# Patient Record
Sex: FEMALE | Race: WHITE | NOT HISPANIC OR LATINO | Employment: UNEMPLOYED | URBAN - METROPOLITAN AREA
[De-identification: names, ages, dates, MRNs, and addresses within clinical notes are randomized per-mention and may not be internally consistent; named-entity substitution may affect disease eponyms.]

---

## 2022-05-20 ENCOUNTER — LAB (OUTPATIENT)
Dept: LAB | Facility: HOSPITAL | Age: 47
End: 2022-05-20

## 2022-05-20 ENCOUNTER — OFFICE VISIT (OUTPATIENT)
Dept: NEUROLOGY | Facility: CLINIC | Age: 47
End: 2022-05-20

## 2022-05-20 VITALS
WEIGHT: 209.2 LBS | HEIGHT: 63 IN | DIASTOLIC BLOOD PRESSURE: 80 MMHG | OXYGEN SATURATION: 98 % | BODY MASS INDEX: 37.07 KG/M2 | HEART RATE: 76 BPM | SYSTOLIC BLOOD PRESSURE: 122 MMHG | TEMPERATURE: 97.5 F

## 2022-05-20 DIAGNOSIS — R42 VERTIGO: ICD-10-CM

## 2022-05-20 DIAGNOSIS — F41.9 ANXIETY: ICD-10-CM

## 2022-05-20 DIAGNOSIS — R42 VERTIGO: Primary | ICD-10-CM

## 2022-05-20 DIAGNOSIS — G43.709 CHRONIC MIGRAINE WITHOUT AURA WITHOUT STATUS MIGRAINOSUS, NOT INTRACTABLE: ICD-10-CM

## 2022-05-20 PROBLEM — M06.9 RHEUMATOID ARTHRITIS INVOLVING MULTIPLE SITES: Status: ACTIVE | Noted: 2022-05-20

## 2022-05-20 PROBLEM — U07.1 COVID-19 VIRUS INFECTION: Status: ACTIVE | Noted: 2022-05-20

## 2022-05-20 LAB
ALBUMIN SERPL-MCNC: 4 G/DL (ref 3.5–5.2)
ALBUMIN/GLOB SERPL: 1.1 G/DL
ALP SERPL-CCNC: 69 U/L (ref 39–117)
ALT SERPL W P-5'-P-CCNC: 24 U/L (ref 1–33)
ANION GAP SERPL CALCULATED.3IONS-SCNC: 11 MMOL/L (ref 5–15)
AST SERPL-CCNC: 19 U/L (ref 1–32)
BASOPHILS # BLD AUTO: 0.06 10*3/MM3 (ref 0–0.2)
BASOPHILS NFR BLD AUTO: 0.7 % (ref 0–1.5)
BILIRUB SERPL-MCNC: 0.4 MG/DL (ref 0–1.2)
BUN SERPL-MCNC: 9 MG/DL (ref 6–20)
BUN/CREAT SERPL: 10.3 (ref 7–25)
CALCIUM SPEC-SCNC: 9.8 MG/DL (ref 8.6–10.5)
CHLORIDE SERPL-SCNC: 107 MMOL/L (ref 98–107)
CHROMATIN AB SERPL-ACNC: <10 IU/ML (ref 0–14)
CK SERPL-CCNC: 39 U/L (ref 20–180)
CO2 SERPL-SCNC: 23 MMOL/L (ref 22–29)
CREAT SERPL-MCNC: 0.87 MG/DL (ref 0.57–1)
DEPRECATED RDW RBC AUTO: 44.2 FL (ref 37–54)
EGFRCR SERPLBLD CKD-EPI 2021: 84.4 ML/MIN/1.73
EOSINOPHIL # BLD AUTO: 0.26 10*3/MM3 (ref 0–0.4)
EOSINOPHIL NFR BLD AUTO: 3 % (ref 0.3–6.2)
ERYTHROCYTE [DISTWIDTH] IN BLOOD BY AUTOMATED COUNT: 12.7 % (ref 12.3–15.4)
FOLATE SERPL-MCNC: 11.6 NG/ML (ref 4.78–24.2)
GLOBULIN UR ELPH-MCNC: 3.5 GM/DL
GLUCOSE SERPL-MCNC: 101 MG/DL (ref 65–99)
HCT VFR BLD AUTO: 43.5 % (ref 34–46.6)
HGB BLD-MCNC: 14.2 G/DL (ref 12–15.9)
IMM GRANULOCYTES # BLD AUTO: 0.02 10*3/MM3 (ref 0–0.05)
IMM GRANULOCYTES NFR BLD AUTO: 0.2 % (ref 0–0.5)
LYMPHOCYTES # BLD AUTO: 2.06 10*3/MM3 (ref 0.7–3.1)
LYMPHOCYTES NFR BLD AUTO: 23.5 % (ref 19.6–45.3)
MCH RBC QN AUTO: 31.1 PG (ref 26.6–33)
MCHC RBC AUTO-ENTMCNC: 32.6 G/DL (ref 31.5–35.7)
MCV RBC AUTO: 95.2 FL (ref 79–97)
MONOCYTES # BLD AUTO: 0.55 10*3/MM3 (ref 0.1–0.9)
MONOCYTES NFR BLD AUTO: 6.3 % (ref 5–12)
NEUTROPHILS NFR BLD AUTO: 5.83 10*3/MM3 (ref 1.7–7)
NEUTROPHILS NFR BLD AUTO: 66.3 % (ref 42.7–76)
NRBC BLD AUTO-RTO: 0 /100 WBC (ref 0–0.2)
PLATELET # BLD AUTO: 397 10*3/MM3 (ref 140–450)
PMV BLD AUTO: 9.6 FL (ref 6–12)
POTASSIUM SERPL-SCNC: 4.7 MMOL/L (ref 3.5–5.2)
PROT SERPL-MCNC: 7.5 G/DL (ref 6–8.5)
RBC # BLD AUTO: 4.57 10*6/MM3 (ref 3.77–5.28)
SODIUM SERPL-SCNC: 141 MMOL/L (ref 136–145)
TSH SERPL DL<=0.05 MIU/L-ACNC: 2 UIU/ML (ref 0.27–4.2)
VIT B12 BLD-MCNC: 306 PG/ML (ref 211–946)
WBC NRBC COR # BLD: 8.78 10*3/MM3 (ref 3.4–10.8)

## 2022-05-20 PROCEDURE — 86235 NUCLEAR ANTIGEN ANTIBODY: CPT

## 2022-05-20 PROCEDURE — 80053 COMPREHEN METABOLIC PANEL: CPT

## 2022-05-20 PROCEDURE — 86362 MOG-IGG1 ANTB CBA EACH: CPT

## 2022-05-20 PROCEDURE — 86225 DNA ANTIBODY NATIVE: CPT

## 2022-05-20 PROCEDURE — 82607 VITAMIN B-12: CPT

## 2022-05-20 PROCEDURE — 82746 ASSAY OF FOLIC ACID SERUM: CPT

## 2022-05-20 PROCEDURE — 86051 AQUAPORIN-4 ANTB ELISA: CPT

## 2022-05-20 PROCEDURE — 86038 ANTINUCLEAR ANTIBODIES: CPT

## 2022-05-20 PROCEDURE — 84443 ASSAY THYROID STIM HORMONE: CPT

## 2022-05-20 PROCEDURE — 85025 COMPLETE CBC W/AUTO DIFF WBC: CPT

## 2022-05-20 PROCEDURE — 99204 OFFICE O/P NEW MOD 45 MIN: CPT | Performed by: NURSE PRACTITIONER

## 2022-05-20 PROCEDURE — 82550 ASSAY OF CK (CPK): CPT

## 2022-05-20 PROCEDURE — 36415 COLL VENOUS BLD VENIPUNCTURE: CPT

## 2022-05-20 PROCEDURE — 86431 RHEUMATOID FACTOR QUANT: CPT

## 2022-05-20 RX ORDER — ALBUTEROL SULFATE 90 UG/1
2 AEROSOL, METERED RESPIRATORY (INHALATION) EVERY 4 HOURS PRN
COMMUNITY

## 2022-05-20 RX ORDER — TOPIRAMATE 50 MG/1
TABLET, FILM COATED ORAL
Qty: 60 TABLET | Refills: 2 | Status: SHIPPED | OUTPATIENT
Start: 2022-05-20 | End: 2022-08-15

## 2022-05-20 RX ORDER — IBUPROFEN 400 MG/1
400 TABLET ORAL 2 TIMES DAILY
COMMUNITY

## 2022-05-20 RX ORDER — MECLIZINE HYDROCHLORIDE 25 MG/1
25 TABLET ORAL 3 TIMES DAILY PRN
COMMUNITY
Start: 2022-04-07

## 2022-05-20 RX ORDER — CETIRIZINE HYDROCHLORIDE 10 MG/1
10 TABLET ORAL DAILY
COMMUNITY

## 2022-05-20 RX ORDER — FLUTICASONE PROPIONATE 50 MCG
2 SPRAY, SUSPENSION (ML) NASAL DAILY
COMMUNITY

## 2022-05-20 RX ORDER — ACETAMINOPHEN 500 MG
500 TABLET ORAL 2 TIMES DAILY
COMMUNITY

## 2022-05-20 RX ORDER — ATORVASTATIN CALCIUM 10 MG/1
10 TABLET, FILM COATED ORAL NIGHTLY
COMMUNITY
Start: 2022-05-17

## 2022-05-20 RX ORDER — OLOPATADINE HYDROCHLORIDE 2 MG/ML
SOLUTION/ DROPS OPHTHALMIC DAILY
COMMUNITY

## 2022-05-20 RX ORDER — FLUOXETINE HYDROCHLORIDE 20 MG/1
20 CAPSULE ORAL DAILY
COMMUNITY

## 2022-05-20 NOTE — PROGRESS NOTES
Headache New Office Visit      Encounter Date: 2022   Patient Name: Anita MONTERO  : 1975   MRN: [id number]   PCP: DENISE Ojeda  Chief Complaint:    Chief Complaint   Patient presents with   • Headache   • Dizziness       History of Present Illness: Anita MONTERO is a 46 y.o. female who is here today for evaluation of headaches and new dizziness symptoms.    Onset age 28.  Headache Symptoms:   Days per month: 20 days  Location: Right temple  and Left temple       Quality: Throbbing        Duration: 1 hour  Severity: 6/10  Triggers: decreased sleep, not eating, stress, allergies  Associated Symptoms: Photophobia, Phonophobia, Dizziness and  Vision changes white flashes  Aura: none      Abortives: Daily IBU 1200mg daily, Tylenol, Imitrex-dizzy and vomit  Preventives propranolol.    No imaging. Has not seen a Neurologist in the past.                                                                               Dizziness/Vertigo  This is independent of HA.  Onset age 38. Room spinning, off balance, holds on to the wall. Sudden onset. No diplopia or loss of vision. Nausea, vomiting, decreased hearing in left, tinnitus high pitched squeal. Controlled with meclizine and flonase and resolved in a few hours.    Now w new symptoms of vertigo  Onset w covid in 2022. Better for 2 months and returned. Daily.   Sudden onset veritgo, nausea, vomiting, palpitations, diaphoresis, occasionally w arm heaviness and tingling. No decreased hearing or tinnitus. Duration is 24/7. If she keeps her head still she does not have symptoms. Duration is 7-10 seconds. Triggered by movement.  Not resolved with meclizine or Flonase  Saw ENT Dr Carroll in [place]. No testing.    Seeing Cardiology-abnormal holter w tachycardia and ANDREW, echo. Refused nuclear GXT.    Anxiety  Has anxiety regarding her health. Does not like to take medications. Willing to have MRI and labs.      PMH: RA-previously on methotrexate, covid  infection, anxiety, cluster HA, psoriasis  Subjective      Past Medical History:   Past Medical History:   Diagnosis Date   • Anxiety    • Arthritis    • Chronic mental illness    • Cluster headache    • Depression    • Dizziness    • Fainting    • Gait abnormality    • Headache, tension-type    • High cholesterol    • History of bladder problems    • Hypertension    • Memory loss    • Migraine    • Numbness and tingling    • Rheumatoid arthritis involving multiple sites ([facility]) 2022   • Weakness        Past Surgical History:   Past Surgical History:   Procedure Laterality Date   • GALLBLADDER SURGERY     • TUBAL ABDOMINAL LIGATION         Family History:   Family History   Problem Relation Age of Onset   • Alzheimer's disease Mother    • Heart disease Mother    • Hypertension Mother    • High cholesterol Mother    • Obesity Mother    • Thyroid disease Mother    • Obesity Father    • Lung cancer Father    • Obesity Sister    • Obesity Brother    • Epilepsy Brother    • Obesity Maternal Aunt    • Obesity Maternal Uncle    • Obesity Paternal Aunt    • Obesity Paternal Uncle    • Obesity Maternal Grandmother    • Heart disease Maternal Grandmother    • Obesity Maternal Grandfather    • Obesity Paternal Grandmother    • Obesity Paternal Grandfather        Social History:   Social History     Socioeconomic History   • Marital status:    Tobacco Use   • Smoking status: Former Smoker     Start date:      Quit date: 2022     Years since quittin.3   Vaping Use   • Vaping Use: Never used   Substance and Sexual Activity   • Alcohol use: Not Currently     Comment: 4-5 beers a week.   • Drug use: Never   • Sexual activity: Defer       Medications:     Current Outpatient Medications:   •  acetaminophen (TYLENOL) 500 MG tablet, Take 500 mg by mouth 2 (Two) Times a Day., Disp: , Rfl:   •  albuterol sulfate  (90 Base) MCG/ACT inhaler, Inhale 2 puffs Every 4 (Four) Hours As Needed for Wheezing.,  Disp: , Rfl:   •  atorvastatin (LIPITOR) 10 MG tablet, , Disp: , Rfl:   •  cetirizine (zyrTEC) 10 MG tablet, Take 10 mg by mouth Daily., Disp: , Rfl:   •  FLUoxetine (PROzac) 20 MG capsule, Take 20 mg by mouth Daily., Disp: , Rfl:   •  fluticasone (FLONASE) 50 MCG/ACT nasal spray, 2 sprays into the nostril(s) as directed by provider Daily., Disp: , Rfl:   •  ibuprofen (ADVIL,MOTRIN) 400 MG tablet, Take 400 mg by mouth 2 (Two) Times a Day., Disp: , Rfl:   •  Multiple Vitamins-Minerals (EMERGEN-C BLUE PO), Take  by mouth Daily., Disp: , Rfl:   •  olopatadine (PATADAY) 0.2 % solution ophthalmic solution, Daily., Disp: , Rfl:   •  meclizine (ANTIVERT) 25 MG tablet, Take 25 mg by mouth 3 (Three) Times a Day As Needed., Disp: , Rfl:   •  topiramate (Topamax) 50 MG tablet, 1/2 tab q hs x 1 week, then 1/2 tab bid, 1 week, then 1 tab bid, Disp: 60 tablet, Rfl: 2    Allergies:   No Known Allergies    PHQ-9 Total Score:     STEADI Fall Risk Assessment has not been completed.    Objective     Physical Exam:   Physical Exam  Eyes:      Pupils: Pupils are equal, round, and reactive to light.   Neurological:      Mental Status: She is oriented to person, place, and time.      Coordination: Finger-Nose-Finger Test, Heel to Shin Test and Romberg Test normal.      Gait: Gait is intact.      Deep Tendon Reflexes:      Reflex Scores:       Tricep reflexes are 2+ on the right side and 2+ on the left side.       Bicep reflexes are 2+ on the right side and 2+ on the left side.       Brachioradialis reflexes are 2+ on the right side and 2+ on the left side.       Patellar reflexes are 2+ on the right side and 2+ on the left side.       Achilles reflexes are 2+ on the right side and 2+ on the left side.  Psychiatric:         Speech: Speech normal.         Neurologic Exam     Mental Status   Oriented to person, place, and time.   Follows 3 step commands.   Attention: normal. Concentration: normal.   Speech: speech is normal   Level of  "consciousness: alert  Knowledge: consistent with education.   Normal comprehension.   Tearful     Cranial Nerves     CN III, IV, VI   Pupils are equal, round, and reactive to light.  Right pupil: Accommodation: intact.   Left pupil: Accommodation: intact.   CN III: no CN III palsy  CN VI: no CN VI palsy  Nystagmus: none   Diplopia: none  Upgaze: normal  Downgaze: normal  Conjugate gaze: present    CN VII   Facial expression full, symmetric.     CN VIII   Hearing: intact    CN XII   CN XII normal.     Motor Exam   Muscle bulk: normal  Overall muscle tone: normal    Strength   Right biceps: 5/5  Left biceps: 5/5  Right triceps: 5/5  Left triceps: 5/5  Right interossei: 5/5  Left interossei: 5/5  Right quadriceps: 5/5  Left quadriceps: 5/5  Right anterior tibial: 5/5  Left anterior tibial: 5/5  Right posterior tibial: 5/5  Left posterior tibial: 5/5    Sensory Exam   Light touch normal.     Gait, Coordination, and Reflexes     Gait  Gait: normal    Coordination   Romberg: negative  Finger to nose coordination: normal  Heel to shin coordination: normal    Tremor   Resting tremor: absent  Action tremor: absent    Reflexes   Right brachioradialis: 2+  Left brachioradialis: 2+  Right biceps: 2+  Left biceps: 2+  Right triceps: 2+  Left triceps: 2+  Right patellar: 2+  Left patellar: 2+  Right achilles: 2+  Left achilles: 2+  Right : 2+  Left : 2+       Vital Signs:   Vitals:    05/20/22 1052   BP: 122/80   Pulse: 76   Temp: 97.5 °F (36.4 °C)   SpO2: 98%   Weight: 94.9 kg (209 lb 3.2 oz)   Height: 160 cm (63\")     Body mass index is 37.06 kg/m².         Assessment / Plan      Assessment/Plan:   Diagnoses and all orders for this visit:    1. Vertigo (Primary)  -     MRI Brain With & Without Contrast; Future  -     CBC Auto Differential; Future  -     CK; Future  -     Comprehensive Metabolic Panel; Future  -     Rheumatoid Factor; Future  -     Vitamin B12 & Folate; Future  -     TSH; Future  -     SAM by IFA, Reflex " 9-biomarkers profile; Future  -     NMO IgG Autoantibodies; Future  -     Anti-Myelin Oligodendrocyte Glycoprotein (MOG), Serum; Future  -     topiramate (Topamax) 50 MG tablet; 1/2 tab q hs x 1 week, then 1/2 tab bid, 1 week, then 1 tab bid  Dispense: 60 tablet; Refill: 2    2. Chronic migraine without aura without status migrainosus, not intractable  -     topiramate (Topamax) 50 MG tablet; 1/2 tab q hs x 1 week, then 1/2 tab bid, 1 week, then 1 tab bid  Dispense: 60 tablet; Refill: 2    3. Anxiety  Comments:  Discussed symptoms and treatment options. She declines at this time.        Patient Education:   I have discussed with the patient today the causes and overview of headaches. We discussed the different types of headaches to include tension-type headaches, Migraine headaches and Cluster headaches. We also discussed when headaches could or would be of a more serious condition such as brain infection, inflammation or bleeding within or around the brain. When to seek immediate medical attention or call 911.     Reviewed medications, potential side effects and signs and symptoms to report. Discussed risk versus benefits of treatment plan with patient and/or family-including medications, labs and radiology that may be ordered. Addressed questions and concerns during visit. Patient and/or family verbalized understanding and agree with plan. Instructed to call the office with any questions and report to ER with any life-threatening symptoms.     Follow Up:   Return in about 8 weeks (around 7/15/2022) for Recheck.    During this visit the following were done:  Labs Reviewed []    Labs Ordered [x]    Radiology Reports Reviewed []    Radiology Ordered [x]    PCP Records Reviewed []    Referring Provider Records Reviewed []    ER Records Reviewed []    Hospital Records Reviewed []    History Obtained From Family []    Radiology Images Reviewed []    Other Reviewed [x]    Records Requested []      Mann Drake,  DNP, APRN

## 2022-05-23 LAB — MOG AB SER QL CBA IFA: NEGATIVE

## 2022-05-24 LAB
ANA NUCLEOLAR TITR SER: ABNORMAL {TITER}
ANA SER QL IF: POSITIVE
CENTROMERE B AB SER-ACNC: <0.2 AI (ref 0–0.9)
CHROMATIN AB SERPL-ACNC: <0.2 AI (ref 0–0.9)
DSDNA AB SER-ACNC: <1 IU/ML (ref 0–9)
ENA JO1 AB SER-ACNC: <0.2 AI (ref 0–0.9)
ENA RNP AB SER-ACNC: 1 AI (ref 0–0.9)
ENA SCL70 AB SER-ACNC: <0.2 AI (ref 0–0.9)
ENA SM AB SER-ACNC: 0.3 AI (ref 0–0.9)
ENA SS-A AB SER-ACNC: <0.2 AI (ref 0–0.9)
ENA SS-B AB SER-ACNC: <0.2 AI (ref 0–0.9)
LABORATORY COMMENT REPORT: ABNORMAL
Lab: ABNORMAL
Lab: ABNORMAL

## 2022-05-25 LAB — AQP4 H2O CHANNEL IGG SERPL IA-ACNC: <1.5 U/ML (ref 0–3)

## 2022-05-26 ENCOUNTER — TELEPHONE (OUTPATIENT)
Dept: NEUROLOGY | Facility: CLINIC | Age: 47
End: 2022-05-26

## 2022-05-26 NOTE — TELEPHONE ENCOUNTER
Called patient and gave results.  Patient was understanding.    ----- Message from Mann Drake DNP, DENISE sent at 5/26/2022  7:27 AM EDT -----  Please notify pt her labs were normal except for the SAM test which can indicate an autoimmune connective tissue disease. She is established with rheumatology. She should discuss this most recent lab with them for further diagnosis.

## 2022-08-09 ENCOUNTER — APPOINTMENT (OUTPATIENT)
Dept: MRI IMAGING | Facility: HOSPITAL | Age: 47
End: 2022-08-09

## 2022-08-14 DIAGNOSIS — G43.709 CHRONIC MIGRAINE WITHOUT AURA WITHOUT STATUS MIGRAINOSUS, NOT INTRACTABLE: ICD-10-CM

## 2022-08-14 DIAGNOSIS — R42 VERTIGO: ICD-10-CM

## 2022-08-15 RX ORDER — TOPIRAMATE 50 MG/1
TABLET, FILM COATED ORAL
Qty: 60 TABLET | Refills: 3 | Status: SHIPPED | OUTPATIENT
Start: 2022-08-15 | End: 2022-11-16 | Stop reason: SDUPTHER

## 2022-08-15 NOTE — TELEPHONE ENCOUNTER
Rx Refill Note  Requested Prescriptions     Pending Prescriptions Disp Refills   • topiramate (TOPAMAX) 50 MG tablet [Pharmacy Med Name: TOPIRAMATE 50 MG TABLET] 180 tablet      Sig: PLEASE SEE ATTACHED FOR DETAILED DIRECTIONS      Last filled: 05/20/2022 60 with 2 refills.  Last office visit with prescribing clinician: 5/20/2022      Next office visit with prescribing clinician: Visit date not found     Sent in 60 with 3 refills.    Stacey Georges MA  08/15/22, 07:58 EDT

## 2022-09-12 ENCOUNTER — HOSPITAL ENCOUNTER (OUTPATIENT)
Dept: MRI IMAGING | Facility: HOSPITAL | Age: 47
Discharge: HOME | End: 2022-09-12
Admitting: NURSE PRACTITIONER

## 2022-09-12 ENCOUNTER — TELEPHONE (OUTPATIENT)
Dept: NEUROLOGY | Facility: CLINIC | Age: 47
End: 2022-09-12

## 2022-09-12 DIAGNOSIS — R42 VERTIGO: ICD-10-CM

## 2022-09-12 PROCEDURE — A9577 INJ MULTIHANCE: HCPCS | Performed by: NURSE PRACTITIONER

## 2022-09-12 PROCEDURE — 70553 MRI BRAIN STEM W/O & W/DYE: CPT

## 2022-09-12 PROCEDURE — 0 GADOBENATE DIMEGLUMINE 529 MG/ML SOLUTION: Performed by: NURSE PRACTITIONER

## 2022-09-12 RX ADMIN — GADOBENATE DIMEGLUMINE 20 ML: 529 INJECTION, SOLUTION INTRAVENOUS at 09:18

## 2022-09-12 NOTE — TELEPHONE ENCOUNTER
Patient called stating she had a headache and wanted to know if she could take Ibuprofen and asked about medications she had taken before the MRI.  Spoke with provider GOGO Rivera and she stated it was ok for patient to take Tylenol 650 every four hours for pain.  Patient was understanding.

## 2022-09-12 NOTE — TELEPHONE ENCOUNTER
Called patient and gave results.      ----- Message from Kan Amos MD sent at 9/12/2022 10:22 AM EDT -----  Notify pt her MRI is normal   ----- Message -----  From: [facility] [place] [facility]  Sent: 9/12/2022   9:55 AM EDT  To: Mann Drake DNP, APRN

## 2022-09-12 NOTE — TELEPHONE ENCOUNTER
Caller: Anita Sandoval    Relationship: Self    Best call back number: [phone]    What was the call regarding: PT CALLED STATING SHE HAS A REALLY BAD HEADACHE AND NEEDED TO KNOW IF SHE CAN TAKE TYLENOL WITH THE CONTRAST DYE FROM MRI THIS MORNING IN MIND. PT STATES SHE CALL RADIOLOGY AND WAS UNABLE TO GET A CLEAR ANSWER. SHE CALLED HER PHARMACY AND WAS INFORMED YES, SHE IS OKAY TO TAKE TYLENOL WITH THE CONTRAST DYE. PT THEN GOOGLED IF SHE IS OKAY TO TAKE TYLENOL WITH CONTRAST DYE AND FOUND THAT SHE SHOULD HAVE EVEN TAKEN IBUPROFEN OR TYLENOL NO MORE THAN 24-48 HRS IN ADVANCE TO DYE INJECTION AS THIS CAN CAUSES RENAL FAILURE. PT REPORT SHE HAS POORLY FUNCTIONING KIDNEYS ANYWAY AND FEARS IF SHE WAS OKAY TO HAVE DYE IN THE FIRST PLACE.    Call warm-transferred to: LAYLA TO GET PT CONNECTED WITH A CLINICAL STAFF MEMBER.    DOCUMENTING PER [employer].

## 2022-11-16 ENCOUNTER — TELEMEDICINE (OUTPATIENT)
Dept: NEUROLOGY | Facility: CLINIC | Age: 47
End: 2022-11-16

## 2022-11-16 DIAGNOSIS — R42 VERTIGO: ICD-10-CM

## 2022-11-16 DIAGNOSIS — F41.9 ANXIETY: ICD-10-CM

## 2022-11-16 DIAGNOSIS — G43.709 CHRONIC MIGRAINE WITHOUT AURA WITHOUT STATUS MIGRAINOSUS, NOT INTRACTABLE: Primary | ICD-10-CM

## 2022-11-16 PROCEDURE — 99214 OFFICE O/P EST MOD 30 MIN: CPT | Performed by: NURSE PRACTITIONER

## 2022-11-16 RX ORDER — TOPIRAMATE 50 MG/1
TABLET, FILM COATED ORAL
Qty: 60 TABLET | Refills: 5 | Status: SHIPPED | OUTPATIENT
Start: 2022-11-16

## 2022-11-16 NOTE — PROGRESS NOTES
Neuro Office Visit      Encounter Date: 2022   Patient Name: Anita Sandoval  : 1975   MRN: [id number]   PCP: Dr Wilson  Chief Complaint:    Chief Complaint   Patient presents with   • Headache   • Dizziness   • Anxiety     You have chosen to receive care through a telehealth visit.  Do you consent to use a video/audio connection for your medical care today? Yes    History of Present Illness: Anita Sandoval is a 47 y.o. female who is here today in Neurology for headaches, anxiety and dizziness.    Last visit 22 w me-MRI brain, labs, start TPM  MRI Brain With & Without Contrast (2022 09:23)-nml  Labs: ck, cmp, tsh, folate, vit b12, cbc, NMO/MOG  SAM+ with RNP abx    Interval History  Seeing Rheumatology at . Diagnosed with psorriatic arthritis and possible lupus.    Headaches  Took TPM for 10 days. It was effective but she is afraid to take it. Headaches are less frequent.    PH  Onset age 28.  Days per month: 20 days  Location: Right temple  and Left temple       Quality: Throbbing        Duration: 1 hour  Severity: 6/10  Triggers: decreased sleep, not eating, stress, allergies  Associated Symptoms: Photophobia, Phonophobia, Dizziness and  Vision changes white flashes  Aura: none  Abortives: Daily IBU 1200mg daily, Tylenol, Imitrex-dizzy and vomit  Preventives propranolol.    Dizziness  It is occurring about 2 days a week. She feels it is hormonal after her cycles.    PH  Onset age 38. Room spinning, off balance, holds on to the wall. Sudden onset. No diplopia or loss of vision. Nausea, vomiting, decreased hearing in left, tinnitus high pitched squeal. Controlled with meclizine and flonase and resolved in a few hours.     New symptoms of vertigo onset w covid in 2022. Better for 2 months and returned. Daily. Nausea, vomiting, palpitations, diaphoresis, occasionally w arm heaviness and tingling. No decreased hearing or tinnitus. Duration is 24/7. If she keeps her head still she does  not have symptoms. Duration is 7-10 seconds. Triggered by movement.  Not resolved with meclizine or Flonase  Saw ENT Dr Carroll in [place]. No testing.  Seeing Cardiology-abnormal holter w tachycardia and ANDREW, echo. Refused nuclear GXT.    Anxiety  She is taking fluoxetine. Seeing psychologist. Had first session yesterday. Possible OCD.     PH  Has anxiety regarding her health. Does not like to take medications    PMH: RA-previously on methotrexate, covid infection, anxiety, cluster HA, psoriasis  Seeing Rheumatology-psorriatric arthritis, possible lupus and liver disease  Subjective      Past Medical History:   Past Medical History:   Diagnosis Date   • SAM positive 11/17/2022   • Anxiety    • Arthritis    • Chronic mental illness    • Cluster headache    • Depression    • Dizziness    • Fainting    • Gait abnormality    • Headache, tension-type    • High cholesterol    • History of bladder problems    • Hypertension    • Memory loss    • Migraine    • Numbness and tingling    • Psoriatic arthritis ([facility]) 11/17/2022   • Rheumatoid arthritis involving multiple sites ([facility]) 05/20/2022   • Weakness        Past Surgical History:   Past Surgical History:   Procedure Laterality Date   • GALLBLADDER SURGERY  2019   • TUBAL ABDOMINAL LIGATION  1999       Family History:   Family History   Problem Relation Age of Onset   • Alzheimer's disease Mother    • Heart disease Mother    • Hypertension Mother    • High cholesterol Mother    • Obesity Mother    • Thyroid disease Mother    • Obesity Father    • Lung cancer Father    • Obesity Sister    • Obesity Brother    • Epilepsy Brother    • Obesity Maternal Aunt    • Obesity Maternal Uncle    • Obesity Paternal Aunt    • Obesity Paternal Uncle    • Obesity Maternal Grandmother    • Heart disease Maternal Grandmother    • Obesity Maternal Grandfather    • Obesity Paternal Grandmother    • Obesity Paternal Grandfather        Social History:   Social History     Socioeconomic  History   • Marital status:    Tobacco Use   • Smoking status: Former     Types: Cigarettes     Start date:      Quit date: 2022     Years since quittin.8   Vaping Use   • Vaping Use: Never used   Substance and Sexual Activity   • Alcohol use: Not Currently     Comment: 4-5 beers a week.   • Drug use: Never   • Sexual activity: Defer       Medications:     Current Outpatient Medications:   •  acetaminophen (TYLENOL) 500 MG tablet, Take 500 mg by mouth 2 (Two) Times a Day., Disp: , Rfl:   •  albuterol sulfate  (90 Base) MCG/ACT inhaler, Inhale 2 puffs Every 4 (Four) Hours As Needed for Wheezing., Disp: , Rfl:   •  atorvastatin (LIPITOR) 10 MG tablet, Take 10 mg by mouth Every Night., Disp: , Rfl:   •  cetirizine (zyrTEC) 10 MG tablet, Take 10 mg by mouth Daily., Disp: , Rfl:   •  FLUoxetine (PROzac) 20 MG capsule, Take 20 mg by mouth Daily., Disp: , Rfl:   •  fluticasone (FLONASE) 50 MCG/ACT nasal spray, 2 sprays into the nostril(s) as directed by provider Daily., Disp: , Rfl:   •  ibuprofen (ADVIL,MOTRIN) 400 MG tablet, Take 400 mg by mouth 2 (Two) Times a Day., Disp: , Rfl:   •  meclizine (ANTIVERT) 25 MG tablet, Take 25 mg by mouth 3 (Three) Times a Day As Needed., Disp: , Rfl:   •  Multiple Vitamins-Minerals (EMERGEN-C BLUE PO), Take  by mouth Daily., Disp: , Rfl:   •  olopatadine (PATADAY) 0.2 % solution ophthalmic solution, Daily., Disp: , Rfl:   •  topiramate (TOPAMAX) 50 MG tablet, 1 tab bid, Disp: 60 tablet, Rfl: 5    Allergies:   No Known Allergies    PHQ-9 Total Score:     STEADI Fall Risk Assessment has not been completed.    Objective     Physical Exam:   Physical Exam  Vitals and nursing note reviewed.   Constitutional:       General: She is not in acute distress.     Appearance: She is well-developed.   HENT:      Head: Normocephalic and atraumatic.      Right Ear: External ear normal.      Left Ear: External ear normal.      Nose: Nose normal.   Eyes:      General: No  scleral icterus.        Right eye: No discharge.         Left eye: No discharge.      Conjunctiva/sclera: Conjunctivae normal.      Pupils: Pupils are equal, round, and reactive to light.   Cardiovascular:      Rate and Rhythm: Normal rate.   Pulmonary:      Effort: Pulmonary effort is normal.   Musculoskeletal:         General: No deformity.      Cervical back: Neck supple.   Skin:     General: Skin is warm and dry.      Findings: No rash.   Neurological:      General: No focal deficit present.      Mental Status: She is alert and oriented to person, place, and time. Mental status is at baseline.      Cranial Nerves: No cranial nerve deficit.      Sensory: No sensory deficit.      Motor: No weakness or abnormal muscle tone.      Coordination: Coordination normal.      Gait: Gait normal.      Deep Tendon Reflexes: Reflexes normal.   Psychiatric:         Mood and Affect: Mood normal.         Behavior: Behavior normal.         Thought Content: Thought content normal.         Judgment: Judgment normal.         Neurologic Exam     Mental Status   Oriented to person, place, and time.     Cranial Nerves     CN III, IV, VI   Pupils are equal, round, and reactive to light.       Vital Signs: There were no vitals filed for this visit.  There is no height or weight on file to calculate BMI.     Results:   Imaging:   MRI Brain With & Without Contrast    Result Date: 9/12/2022  No evidence of intracranial abnormality.  This report was finalized on 9/12/2022 9:52 AM by Chao Mcleod.             Assessment / Plan      Assessment/Plan:   Diagnoses and all orders for this visit:    1. Chronic migraine without aura without status migrainosus, not intractable (Primary)  Comments:  Cont TPM. Second trial. Stopped due to anxiety.  Orders:  -     topiramate (TOPAMAX) 50 MG tablet; 1 tab bid  Dispense: 60 tablet; Refill: 5    2. Vertigo  Comments:  Related to hormones-stable.  Orders:  -     topiramate (TOPAMAX) 50 MG tablet; 1 tab  bid  Dispense: 60 tablet; Refill: 5    3. Anxiety  Comments:  Cont [facility] psychology and psychiatry         Patient Education:     Reviewed medications, potential side effects and signs and symptoms to report. Discussed risk versus benefits of treatment plan with patient and/or family-including medications, labs and radiology that may be ordered. Addressed questions and concerns during visit. Patient and/or family verbalized understanding and agree with plan. Instructed to call the office with any questions and report to ER with any life-threatening symptoms.     Follow Up:   Return in about 3 months (around 2/16/2023).    During this visit the following were done:  Labs Reviewed []    Labs Ordered []    Radiology Reports Reviewed []    Radiology Ordered []    PCP Records Reviewed []    Referring Provider Records Reviewed []    ER Records Reviewed []    Hospital Records Reviewed []    History Obtained From Family []    Radiology Images Reviewed []    Other Reviewed []    Records Requested []      Mann Drake, DNP, APRN

## 2022-11-17 PROBLEM — L40.50 PSORIATIC ARTHRITIS: Status: ACTIVE | Noted: 2022-11-17

## 2022-11-17 PROBLEM — R76.8 ANA POSITIVE: Status: ACTIVE | Noted: 2022-11-17
